# Patient Record
Sex: MALE | Race: WHITE
[De-identification: names, ages, dates, MRNs, and addresses within clinical notes are randomized per-mention and may not be internally consistent; named-entity substitution may affect disease eponyms.]

---

## 2018-01-23 ENCOUNTER — HOSPITAL ENCOUNTER (EMERGENCY)
Dept: HOSPITAL 12 - ER | Age: 43
Discharge: HOME | End: 2018-01-23
Payer: MEDICARE

## 2018-01-23 VITALS — WEIGHT: 240 LBS | HEIGHT: 73 IN | BODY MASS INDEX: 31.81 KG/M2

## 2018-01-23 DIAGNOSIS — J02.8: Primary | ICD-10-CM

## 2018-01-23 DIAGNOSIS — Z88.0: ICD-10-CM

## 2018-01-23 DIAGNOSIS — B97.89: ICD-10-CM

## 2018-01-23 PROCEDURE — 71045 X-RAY EXAM CHEST 1 VIEW: CPT

## 2018-01-23 PROCEDURE — A4663 DIALYSIS BLOOD PRESSURE CUFF: HCPCS

## 2018-01-23 PROCEDURE — 99283 EMERGENCY DEPT VISIT LOW MDM: CPT

## 2019-10-20 ENCOUNTER — HOSPITAL ENCOUNTER (EMERGENCY)
Dept: HOSPITAL 12 - ER | Age: 44
Discharge: HOME | End: 2019-10-20
Payer: MEDICARE

## 2019-10-20 VITALS — HEIGHT: 73 IN | WEIGHT: 230 LBS | BODY MASS INDEX: 30.48 KG/M2

## 2019-10-20 DIAGNOSIS — J06.9: Primary | ICD-10-CM

## 2019-10-20 DIAGNOSIS — Z88.0: ICD-10-CM

## 2019-10-20 DIAGNOSIS — F32.9: ICD-10-CM

## 2019-10-20 PROCEDURE — A4663 DIALYSIS BLOOD PRESSURE CUFF: HCPCS

## 2019-10-20 NOTE — NUR
Patient discharged to home in stable conditon.  Written and verbal after care 
instructions given. 

Patient verbalizes understanding of instructions. WALKED OUT OF ER WITH NO 
DISTRESS NOTED.